# Patient Record
Sex: MALE
[De-identification: names, ages, dates, MRNs, and addresses within clinical notes are randomized per-mention and may not be internally consistent; named-entity substitution may affect disease eponyms.]

---

## 2018-01-12 ENCOUNTER — HOSPITAL ENCOUNTER (EMERGENCY)
Dept: HOSPITAL 92 - ERS | Age: 33
Discharge: HOME | End: 2018-01-12
Payer: SELF-PAY

## 2018-01-12 DIAGNOSIS — J45.901: Primary | ICD-10-CM

## 2018-01-12 PROCEDURE — 71046 X-RAY EXAM CHEST 2 VIEWS: CPT

## 2018-01-12 PROCEDURE — 94640 AIRWAY INHALATION TREATMENT: CPT

## 2018-01-12 NOTE — RAD
CHEST TWO VIEWS:

 

01/12/2018

 

HISTORY:

Flu symptoms.  Asthma.

 

COMPARISON:

None.

 

FINDINGS:

The lungs are clear.  The heart and mediastinal contours are within normal limits.  No acute osseous 
abnormality.

 

IMPRESSION:

No acute findings.

 

POS: [facility]